# Patient Record
Sex: FEMALE | Race: WHITE | Employment: FULL TIME | URBAN - METROPOLITAN AREA
[De-identification: names, ages, dates, MRNs, and addresses within clinical notes are randomized per-mention and may not be internally consistent; named-entity substitution may affect disease eponyms.]

---

## 2020-10-08 ENCOUNTER — HOSPITAL ENCOUNTER (EMERGENCY)
Age: 30
Discharge: HOME | End: 2020-10-09
Attending: EMERGENCY MEDICINE
Payer: MEDICAID

## 2020-10-08 PROCEDURE — 99282 EMERGENCY DEPT VISIT SF MDM: CPT

## 2020-10-09 VITALS
SYSTOLIC BLOOD PRESSURE: 123 MMHG | OXYGEN SATURATION: 99 % | BODY MASS INDEX: 22.49 KG/M2 | HEIGHT: 65 IN | HEART RATE: 79 BPM | DIASTOLIC BLOOD PRESSURE: 70 MMHG | RESPIRATION RATE: 16 BRPM | WEIGHT: 135 LBS

## 2020-10-09 PROCEDURE — 6370000000 HC RX 637 (ALT 250 FOR IP): Performed by: EMERGENCY MEDICINE

## 2020-10-09 RX ORDER — HYDROXYZINE 50 MG/1
50 TABLET, FILM COATED ORAL EVERY 8 HOURS PRN
Qty: 30 TABLET | Refills: 0 | Status: SHIPPED | OUTPATIENT
Start: 2020-10-09 | End: 2020-10-19

## 2020-10-09 RX ORDER — HYDROXYZINE PAMOATE 50 MG/1
50 CAPSULE ORAL ONCE
Status: COMPLETED | OUTPATIENT
Start: 2020-10-09 | End: 2020-10-09

## 2020-10-09 RX ADMIN — HYDROXYZINE PAMOATE 50 MG: 50 CAPSULE ORAL at 00:46

## 2020-10-09 ASSESSMENT — ENCOUNTER SYMPTOMS
SORE THROAT: 0
VOMITING: 0
ABDOMINAL PAIN: 0
EYE DISCHARGE: 0
DIARRHEA: 0
BACK PAIN: 0
SHORTNESS OF BREATH: 1
COUGH: 0
NAUSEA: 0

## 2020-10-09 NOTE — ED PROVIDER NOTES
headaches. Hematological: Does not bruise/bleed easily. Psychiatric/Behavioral: Negative for confusion. The patient is nervous/anxious. Positives and Pertinent negatives as per HPI. Except as noted above in the ROS, all other systems were reviewed and negative. PAST MEDICAL HISTORY     Past Medical History:   Diagnosis Date    Asthma     Hepatitis C          SURGICAL HISTORY     Past Surgical History:   Procedure Laterality Date    TONSILLECTOMY           CURRENTMEDICATIONS       Discharge Medication List as of 10/9/2020 12:27 AM      CONTINUE these medications which have NOT CHANGED    Details   omeprazole (PRILOSEC) 40 MG capsule Take 1 capsule by mouth daily for 20 days. , Disp-20 capsule, R-0      acetaminophen (AMINOFEN) 325 MG tablet Take 1 tablet by mouth every 6 hours as needed for Pain for up to 5 days. , Disp-20 tablet, R-3      ibuprofen (IBU) 800 MG tablet Take 1 tablet by mouth every 8 hours as needed for Pain., Disp-20 tablet, R-0      ibuprofen (IBU) 600 MG tablet Take 1 tablet by mouth every 6 hours as needed for Pain for 20 doses. , Disp-20 tablet, R-0               ALLERGIES     Patient has no known allergies. FAMILYHISTORY     History reviewed. No pertinent family history.        SOCIAL HISTORY       Social History     Socioeconomic History    Marital status: Single     Spouse name: None    Number of children: None    Years of education: None    Highest education level: None   Occupational History    None   Social Needs    Financial resource strain: None    Food insecurity     Worry: None     Inability: None    Transportation needs     Medical: None     Non-medical: None   Tobacco Use    Smoking status: Never Smoker   Substance and Sexual Activity    Alcohol use: No     Comment: \"rarely\"    Drug use: Yes     Types: Marijuana    Sexual activity: Not Currently   Lifestyle    Physical activity     Days per week: None     Minutes per session: None    Stress: None Relationships    Social connections     Talks on phone: None     Gets together: None     Attends Catholic service: None     Active member of club or organization: None     Attends meetings of clubs or organizations: None     Relationship status: None    Intimate partner violence     Fear of current or ex partner: None     Emotionally abused: None     Physically abused: None     Forced sexual activity: None   Other Topics Concern    None   Social History Narrative    None       SCREENINGS             PHYSICAL EXAM    (up to 7 for level 4, 8 or more for level 5)     ED Triage Vitals [10/08/20 2159]   BP Temp Temp src Pulse Resp SpO2 Height Weight   114/83 -- -- 88 18 100 % 5' 5\" (1.651 m) 135 lb (61.2 kg)       Physical Exam  Constitutional:       General: She is not in acute distress. Appearance: She is well-developed. HENT:      Head: Normocephalic and atraumatic. Right Ear: External ear normal.      Left Ear: External ear normal.      Nose: Nose normal.      Mouth/Throat:      Pharynx: No oropharyngeal exudate. Eyes:      Conjunctiva/sclera: Conjunctivae normal.      Pupils: Pupils are equal, round, and reactive to light. Neck:      Musculoskeletal: Normal range of motion and neck supple. Cardiovascular:      Rate and Rhythm: Normal rate and regular rhythm. Heart sounds: Normal heart sounds. No murmur. No friction rub. No gallop. Pulmonary:      Effort: Pulmonary effort is normal. No respiratory distress. Breath sounds: Normal breath sounds. No wheezing. Abdominal:      General: Bowel sounds are normal. There is no distension. Palpations: Abdomen is soft. Tenderness: There is no abdominal tenderness. There is no guarding or rebound. Musculoskeletal: Normal range of motion. General: No tenderness. Lymphadenopathy:      Cervical: No cervical adenopathy. Skin:     General: Skin is warm and dry. Findings: No rash.    Neurological:      Mental Status: She is alert and oriented to person, place, and time. Cranial Nerves: No cranial nerve deficit. Psychiatric:         Mood and Affect: Mood is anxious. Affect is tearful. Speech: Speech normal.         Behavior: Behavior normal. Behavior is cooperative. Thought Content: Thought content normal.         DIAGNOSTIC RESULTS   LABS:    No results found for this visit on 10/08/20. All other labs were within normal range ornot returned as of this dictation. EKG: All EKG's are interpreted by the Emergency Department Physician who either signs or Co-signs this chart in the absence of a cardiologist.  Please see their note for interpretation of EKG. RADIOLOGY:   Non-plain film images such as CT, Ultrasound and MRI are read by the radiologist.  Plain radiographic images are visualized and preliminarily interpreted by the ED Provider with the belowfindings:    Interpretation per the Radiologist below, if available at the time of this note:    No orders to display         PROCEDURES   Unless otherwise noted below, none     Procedures    CRITICAL CARE TIME   N/A    CONSULTS:  None      EMERGENCY DEPARTMENT COURSE and DIFFERENTIAL DIAGNOSIS/MDM:   Vitals:    Vitals:    10/08/20 2159 10/08/20 2308 10/09/20 0052   BP: 114/83 122/74 123/70   Pulse: 88 80 79   Resp: 18 16 16   SpO2: 100% 99%    Weight: 135 lb (61.2 kg)     Height: 5' 5\" (1.651 m)         Patient was given the following medications:  Medications   hydrOXYzine (VISTARIL) capsule 50 mg (50 mg Oral Given 10/9/20 0046)     I planned to obtain blood work, imaging, EKG, and to monitor the patient. Patient states that she feels better and does not want to stay in the hospital.  I told her that many dangerous conditions can present with similar symptoms to anxiety. I told her that without additional testing that I could not rule these conditions out. Patient is still adamant that she go home. I am prescribing the patient hydroxyzine.   I want her to follow up closely with her PCP. She and her father are agreeable with this. I have recommended further workup including possible admission to the hospital, but Kvng Waddell refuses. The risks (including but not limited to suffering and death) as well as the benefits were explained to the patient. Questions were sought and answered and the patient voiced understanding. However, Kvng Waddell refuses admission. I have encouraged the patient to return to have their evaluation completed as we are glad to do so. I have also instructed Kvng Waddell on the importance of follow-up and to return for any worsening or worrisome concerns. Kvng Waddell appears competent to make medical decisions at this time. Differential diagnosis included but was not limited to: acute coronary syndrome, pulmonary embolism, COPD/asthma, pneumonia, sepsis, pericardial tamponade, pneumothorax, CHF, thoracic aortic dissection, anxiety. The patient understands the importance of follow up and reasons to return. Wells' Criteria for PE:   Paralysis, paresis or recent immobilization of the leg? no (0)   Clinical sign of DVT?  no (0)   Hemoptysis?  no (0)   Heart rate greater than 100 BPM?  no (0)   Malignancy w/ Treatment within 6 mo, or palliative?   no (0)      Hx of DVT or PE? no (0)   Active cancer within 6 months? no (0)   Alternative diagnosis more likely than PE? yes (0)   Total:    0          Low Risk Group: 0-2         Mod-High Risk Group: 2+    PERC Rule:  Applicable in this patient who has low clinical suspicion for pulmonary embolism. Age < 48years old: Yes  Heart rate < 100 bpm: Yes  Oxygen saturation > 95%: Yes  Hemoptysis: No  Exogenous estrogen use: No  Prior history of DVT or PE: No  Unilateral leg swelling: No  Surgery or significant trauma in the past 4 weeks: No    Based on the above, PE can effectively be ruled out without further testing. FINAL IMPRESSION      1. Shortness of breath    2.

## 2020-10-15 ENCOUNTER — APPOINTMENT (OUTPATIENT)
Dept: GENERAL RADIOLOGY | Age: 30
End: 2020-10-15
Payer: MEDICAID

## 2020-10-15 ENCOUNTER — HOSPITAL ENCOUNTER (EMERGENCY)
Age: 30
Discharge: HOME | End: 2020-10-15
Attending: EMERGENCY MEDICINE
Payer: MEDICAID

## 2020-10-15 ENCOUNTER — APPOINTMENT (OUTPATIENT)
Dept: CT IMAGING | Age: 30
End: 2020-10-15
Payer: MEDICAID

## 2020-10-15 VITALS
OXYGEN SATURATION: 100 % | BODY MASS INDEX: 22.49 KG/M2 | DIASTOLIC BLOOD PRESSURE: 81 MMHG | SYSTOLIC BLOOD PRESSURE: 128 MMHG | HEART RATE: 70 BPM | HEIGHT: 65 IN | RESPIRATION RATE: 17 BRPM | TEMPERATURE: 98 F | WEIGHT: 135 LBS

## 2020-10-15 LAB
A/G RATIO: 1.3 (ref 1.1–2.2)
ALBUMIN SERPL-MCNC: 4.9 G/DL (ref 3.4–5)
ALP BLD-CCNC: 68 U/L (ref 40–129)
ALT SERPL-CCNC: 14 U/L (ref 10–40)
AMPHETAMINE SCREEN, URINE: NORMAL
ANION GAP SERPL CALCULATED.3IONS-SCNC: 13 MMOL/L (ref 3–16)
AST SERPL-CCNC: 25 U/L (ref 15–37)
BACTERIA: ABNORMAL /HPF
BARBITURATE SCREEN URINE: NORMAL
BASOPHILS ABSOLUTE: 0 K/UL (ref 0–0.2)
BASOPHILS RELATIVE PERCENT: 0.3 %
BENZODIAZEPINE SCREEN, URINE: NORMAL
BILIRUB SERPL-MCNC: 0.3 MG/DL (ref 0–1)
BILIRUBIN URINE: NEGATIVE
BLOOD, URINE: ABNORMAL
BUN BLDV-MCNC: 8 MG/DL (ref 7–20)
CALCIUM SERPL-MCNC: 10.6 MG/DL (ref 8.3–10.6)
CANNABINOID SCREEN URINE: NORMAL
CHLORIDE BLD-SCNC: 102 MMOL/L (ref 99–110)
CLARITY: CLEAR
CO2: 22 MMOL/L (ref 21–32)
COCAINE METABOLITE SCREEN URINE: NORMAL
COLOR: ABNORMAL
CREAT SERPL-MCNC: 0.8 MG/DL (ref 0.6–1.1)
D DIMER: 269 NG/ML DDU (ref 0–229)
EKG ATRIAL RATE: 70 BPM
EKG DIAGNOSIS: NORMAL
EKG P AXIS: 62 DEGREES
EKG P-R INTERVAL: 172 MS
EKG Q-T INTERVAL: 402 MS
EKG QRS DURATION: 86 MS
EKG QTC CALCULATION (BAZETT): 434 MS
EKG R AXIS: 91 DEGREES
EKG T AXIS: 65 DEGREES
EKG VENTRICULAR RATE: 70 BPM
EOSINOPHILS ABSOLUTE: 0 K/UL (ref 0–0.6)
EOSINOPHILS RELATIVE PERCENT: 0.2 %
EPITHELIAL CELLS, UA: ABNORMAL /HPF (ref 0–5)
ETHANOL: NORMAL MG/DL (ref 0–0.08)
GFR AFRICAN AMERICAN: >60
GFR NON-AFRICAN AMERICAN: >60
GLOBULIN: 3.9 G/DL
GLUCOSE BLD-MCNC: 129 MG/DL (ref 70–99)
GLUCOSE URINE: NEGATIVE MG/DL
HCG QUALITATIVE: NEGATIVE
HCT VFR BLD CALC: 38.8 % (ref 36–48)
HEMOGLOBIN: 13.3 G/DL (ref 12–16)
KETONES, URINE: NEGATIVE MG/DL
LEUKOCYTE ESTERASE, URINE: ABNORMAL
LIPASE: 15 U/L (ref 13–60)
LYMPHOCYTES ABSOLUTE: 1.1 K/UL (ref 1–5.1)
LYMPHOCYTES RELATIVE PERCENT: 18 %
Lab: NORMAL
MCH RBC QN AUTO: 27.7 PG (ref 26–34)
MCHC RBC AUTO-ENTMCNC: 34.4 G/DL (ref 31–36)
MCV RBC AUTO: 80.5 FL (ref 80–100)
METHADONE SCREEN, URINE: NORMAL
MICROSCOPIC EXAMINATION: YES
MONOCYTES ABSOLUTE: 0.3 K/UL (ref 0–1.3)
MONOCYTES RELATIVE PERCENT: 4.2 %
NEUTROPHILS ABSOLUTE: 4.9 K/UL (ref 1.7–7.7)
NEUTROPHILS RELATIVE PERCENT: 77.3 %
NITRITE, URINE: NEGATIVE
OPIATE SCREEN URINE: NORMAL
OXYCODONE URINE: NORMAL
PDW BLD-RTO: 14.1 % (ref 12.4–15.4)
PH UA: 8.5
PH UA: 8.5 (ref 5–8)
PHENCYCLIDINE SCREEN URINE: NORMAL
PLATELET # BLD: 201 K/UL (ref 135–450)
PMV BLD AUTO: 9.3 FL (ref 5–10.5)
POTASSIUM REFLEX MAGNESIUM: 3.7 MMOL/L (ref 3.5–5.1)
PROPOXYPHENE SCREEN: NORMAL
PROTEIN UA: NEGATIVE MG/DL
RBC # BLD: 4.82 M/UL (ref 4–5.2)
RBC UA: ABNORMAL /HPF (ref 0–4)
SODIUM BLD-SCNC: 137 MMOL/L (ref 136–145)
SPECIFIC GRAVITY UA: 1.01 (ref 1–1.03)
TOTAL PROTEIN: 8.8 G/DL (ref 6.4–8.2)
TROPONIN: <0.01 NG/ML
URINE REFLEX TO CULTURE: YES
URINE TYPE: ABNORMAL
UROBILINOGEN, URINE: 0.2 E.U./DL
WBC # BLD: 6.4 K/UL (ref 4–11)
WBC UA: ABNORMAL /HPF (ref 0–5)

## 2020-10-15 PROCEDURE — G0480 DRUG TEST DEF 1-7 CLASSES: HCPCS

## 2020-10-15 PROCEDURE — 71045 X-RAY EXAM CHEST 1 VIEW: CPT

## 2020-10-15 PROCEDURE — 80053 COMPREHEN METABOLIC PANEL: CPT

## 2020-10-15 PROCEDURE — 93010 ELECTROCARDIOGRAM REPORT: CPT | Performed by: INTERNAL MEDICINE

## 2020-10-15 PROCEDURE — 84484 ASSAY OF TROPONIN QUANT: CPT

## 2020-10-15 PROCEDURE — 84703 CHORIONIC GONADOTROPIN ASSAY: CPT

## 2020-10-15 PROCEDURE — 81001 URINALYSIS AUTO W/SCOPE: CPT

## 2020-10-15 PROCEDURE — 2500000003 HC RX 250 WO HCPCS: Performed by: PHYSICIAN ASSISTANT

## 2020-10-15 PROCEDURE — 80307 DRUG TEST PRSMV CHEM ANLYZR: CPT

## 2020-10-15 PROCEDURE — 2580000003 HC RX 258: Performed by: PHYSICIAN ASSISTANT

## 2020-10-15 PROCEDURE — 6370000000 HC RX 637 (ALT 250 FOR IP): Performed by: PHYSICIAN ASSISTANT

## 2020-10-15 PROCEDURE — 71260 CT THORAX DX C+: CPT

## 2020-10-15 PROCEDURE — 93005 ELECTROCARDIOGRAM TRACING: CPT | Performed by: PHYSICIAN ASSISTANT

## 2020-10-15 PROCEDURE — 6360000004 HC RX CONTRAST MEDICATION: Performed by: PHYSICIAN ASSISTANT

## 2020-10-15 PROCEDURE — 96365 THER/PROPH/DIAG IV INF INIT: CPT

## 2020-10-15 PROCEDURE — 83690 ASSAY OF LIPASE: CPT

## 2020-10-15 PROCEDURE — 87086 URINE CULTURE/COLONY COUNT: CPT

## 2020-10-15 PROCEDURE — 99284 EMERGENCY DEPT VISIT MOD MDM: CPT

## 2020-10-15 PROCEDURE — 96375 TX/PRO/DX INJ NEW DRUG ADDON: CPT

## 2020-10-15 PROCEDURE — 85025 COMPLETE CBC W/AUTO DIFF WBC: CPT

## 2020-10-15 PROCEDURE — 85379 FIBRIN DEGRADATION QUANT: CPT

## 2020-10-15 PROCEDURE — 6360000002 HC RX W HCPCS: Performed by: PHYSICIAN ASSISTANT

## 2020-10-15 RX ORDER — CEFDINIR 300 MG/1
300 CAPSULE ORAL 2 TIMES DAILY
Qty: 20 CAPSULE | Refills: 0 | Status: SHIPPED | OUTPATIENT
Start: 2020-10-15 | End: 2020-10-25

## 2020-10-15 RX ORDER — 0.9 % SODIUM CHLORIDE 0.9 %
1000 INTRAVENOUS SOLUTION INTRAVENOUS ONCE
Status: COMPLETED | OUTPATIENT
Start: 2020-10-15 | End: 2020-10-15

## 2020-10-15 RX ORDER — FAMOTIDINE 20 MG/1
20 TABLET, FILM COATED ORAL 2 TIMES DAILY
Qty: 10 TABLET | Refills: 0 | Status: SHIPPED | OUTPATIENT
Start: 2020-10-15 | End: 2020-10-20

## 2020-10-15 RX ORDER — AZITHROMYCIN 250 MG/1
250 TABLET, FILM COATED ORAL DAILY
Qty: 4 TABLET | Refills: 0 | Status: SHIPPED | OUTPATIENT
Start: 2020-10-15 | End: 2020-10-19

## 2020-10-15 RX ORDER — LORAZEPAM 2 MG/ML
0.5 INJECTION INTRAMUSCULAR ONCE
Status: COMPLETED | OUTPATIENT
Start: 2020-10-15 | End: 2020-10-15

## 2020-10-15 RX ORDER — AZITHROMYCIN 250 MG/1
500 TABLET, FILM COATED ORAL ONCE
Status: COMPLETED | OUTPATIENT
Start: 2020-10-15 | End: 2020-10-15

## 2020-10-15 RX ORDER — ONDANSETRON 4 MG/1
4 TABLET, ORALLY DISINTEGRATING ORAL ONCE
Status: COMPLETED | OUTPATIENT
Start: 2020-10-15 | End: 2020-10-15

## 2020-10-15 RX ORDER — POLYETHYLENE GLYCOL 3350 17 G/17G
17 POWDER, FOR SOLUTION ORAL DAILY PRN
Qty: 527 G | Refills: 0 | Status: SHIPPED | OUTPATIENT
Start: 2020-10-15 | End: 2020-10-18

## 2020-10-15 RX ORDER — ONDANSETRON 4 MG/1
4 TABLET, FILM COATED ORAL EVERY 8 HOURS PRN
Qty: 10 TABLET | Refills: 0 | Status: SHIPPED | OUTPATIENT
Start: 2020-10-15

## 2020-10-15 RX ORDER — ONDANSETRON 2 MG/ML
4 INJECTION INTRAMUSCULAR; INTRAVENOUS ONCE
Status: COMPLETED | OUTPATIENT
Start: 2020-10-15 | End: 2020-10-15

## 2020-10-15 RX ADMIN — IOPAMIDOL 75 ML: 755 INJECTION, SOLUTION INTRAVENOUS at 20:16

## 2020-10-15 RX ADMIN — CEFTRIAXONE SODIUM 1 G: 1 INJECTION, POWDER, FOR SOLUTION INTRAMUSCULAR; INTRAVENOUS at 20:26

## 2020-10-15 RX ADMIN — AZITHROMYCIN 500 MG: 250 TABLET, FILM COATED ORAL at 21:50

## 2020-10-15 RX ADMIN — LORAZEPAM 0.5 MG: 2 INJECTION INTRAMUSCULAR; INTRAVENOUS at 18:59

## 2020-10-15 RX ADMIN — FAMOTIDINE 20 MG: 10 INJECTION INTRAVENOUS at 20:26

## 2020-10-15 RX ADMIN — ONDANSETRON HYDROCHLORIDE 4 MG: 2 INJECTION, SOLUTION INTRAMUSCULAR; INTRAVENOUS at 18:59

## 2020-10-15 RX ADMIN — SODIUM CHLORIDE 1000 ML: 9 INJECTION, SOLUTION INTRAVENOUS at 18:59

## 2020-10-15 RX ADMIN — ONDANSETRON 4 MG: 4 TABLET, ORALLY DISINTEGRATING ORAL at 21:50

## 2020-10-15 ASSESSMENT — PAIN DESCRIPTION - LOCATION
LOCATION: ABDOMEN
LOCATION: ABDOMEN

## 2020-10-15 ASSESSMENT — ENCOUNTER SYMPTOMS
COUGH: 1
SHORTNESS OF BREATH: 1
CONSTIPATION: 1
VOMITING: 1
ABDOMINAL PAIN: 1
NAUSEA: 1

## 2020-10-15 ASSESSMENT — PAIN DESCRIPTION - PAIN TYPE
TYPE: ACUTE PAIN
TYPE: ACUTE PAIN

## 2020-10-15 ASSESSMENT — PAIN DESCRIPTION - DESCRIPTORS: DESCRIPTORS: BURNING;ACHING

## 2020-10-15 ASSESSMENT — PAIN SCALES - GENERAL
PAINLEVEL_OUTOF10: 2
PAINLEVEL_OUTOF10: 6

## 2020-10-15 ASSESSMENT — PAIN DESCRIPTION - ORIENTATION
ORIENTATION: LOWER
ORIENTATION: LOWER

## 2020-10-15 NOTE — LETTER
[address]  [address] [facility] [address]  Phone: [phone]               October 15, 2020    Patient: Elvis Klein   YOB: 1990   Date of Visit: 10/15/2020       To Whom It May Concern:    Yudy Hardin was seen and treated in our emergency department on 10/15/2020. She may return to work on 10/18/2020.     Sincerely,       Marta Cantu RN         Signature:__________________________________

## 2020-10-15 NOTE — ED PROVIDER NOTES
[address]  EMERGENCY DEPARTMENT ENCOUNTER        Pt Name: Bishop Blanco  MRN: [id number]  [place] 1990  Date of evaluation: 10/15/2020  Provider: Myra Goodrich PA-C  PCP: Beverley Elizondo    Shared Visit or Autonomous Visit:  I have seen and evaluated this patient with my supervising physician Pranav Brar MD.    Giuseppe Cade       Chief Complaint   Patient presents with    Abdominal Pain     x 1 day    Emesis     coughing up blood       HISTORY OF PRESENT ILLNESS   (Location/Symptom, Timing/Onset, Context/Setting, Quality, Duration, Modifying Factors, Severity)  Note limiting factors. Bishop Blanco is a 27 y.o. female presenting to the emergency department for evaluation states she has not felt well for the past week states she is had a dry cough today started coughing up black and then some blood. States she has felt short of breath for the past 1 week. She went to the ER 1 week ago for her symptoms but states it was 1 in the morning and decided to leave without having testing done was given hydroxyzine for panic attack states it made her anxiety worse. Her child's father  of overdose 5 months ago she is states scared to death she is going to die too. State was having chest pain sharp stabbing pains last week. Last chest pain was 4 days ago. The past 2 days having generalized abdominal pain nausea and today vomited 3 times. No fever. History of hepatitis C. Her mother has pneumonia. States feels like her lungs are closing up on her and feels like she is going to pass out. Smokes marijuana. She is on Suboxone. Denies any other drug use. No cigarette smoking. No known heart problems. She is adopted does not know her family history. States she also started having vaginal bleeding yesterday states was not time for her period. Her last menstrual cycle was 2020.   Long history of anxiety states she had been on multiple anxiety medications in the past but nothing is ever helped her. The history is provided by the patient. Shortness of Breath   Duration:  1 week  Timing:  Constant  Progression:  Worsening  Chronicity:  New  Associated symptoms: abdominal pain, chest pain, cough and vomiting    Associated symptoms: no fever and no syncope    Abdominal pain:     Location:  Generalized    Duration:  2 days    Chronicity:  New  Chest pain:     Quality: sharp      Timing:  Intermittent    Chronicity:  New  Risk factors: no hx of PE/DVT, no obesity, no oral contraceptive use, no prolonged immobilization, no recent surgery and no tobacco use          Nursing Notes were reviewed    REVIEW OF SYSTEMS    (2-9 systems for level 4, 10 or more for level 5)     Review of Systems   Constitutional: Positive for fatigue. Negative for fever. Respiratory: Positive for cough and shortness of breath. Coughing blood   Cardiovascular: Positive for chest pain. Negative for leg swelling and syncope. Gastrointestinal: Positive for abdominal pain, constipation, nausea and vomiting. Genitourinary: Positive for dysuria, frequency and vaginal bleeding. Neurological: Positive for light-headedness. Negative for syncope. Psychiatric/Behavioral: Negative for confusion. All other systems reviewed and are negative. Positives and Pertinent negatives as per HPI. PAST MEDICAL HISTORY     Past Medical History:   Diagnosis Date    Asthma     Hepatitis C          SURGICAL HISTORY     Past Surgical History:   Procedure Laterality Date    TONSILLECTOMY           CURRENTMEDICATIONS       Discharge Medication List as of 10/15/2020  9:48 PM      CONTINUE these medications which have NOT CHANGED    Details   hydrOXYzine (ATARAX) 50 MG tablet Take 1 tablet by mouth every 8 hours as needed for Itching, Disp-30 tablet,R-0Normal      omeprazole (PRILOSEC) 40 MG capsule Take 1 capsule by mouth daily for 20 days. , Disp-20 capsule, R-0      acetaminophen (AMINOFEN) 325 MG tablet Take 1 tablet by mouth every 6 hours as needed for Pain for up to 5 days. , Disp-20 tablet, R-3      ibuprofen (IBU) 800 MG tablet Take 1 tablet by mouth every 8 hours as needed for Pain., Disp-20 tablet, R-0      ibuprofen (IBU) 600 MG tablet Take 1 tablet by mouth every 6 hours as needed for Pain for 20 doses. , Disp-20 tablet, R-0               ALLERGIES     Patient has no known allergies. FAMILYHISTORY     No family history on file.        SOCIAL HISTORY       Social History     Socioeconomic History    Marital status: Single     Spouse name: Not on file    Number of children: Not on file    Years of education: Not on file    Highest education level: Not on file   Occupational History    Not on file   Social Needs    Financial resource strain: Not on file    Food insecurity     Worry: Not on file     Inability: Not on file    Transportation needs     Medical: Not on file     Non-medical: Not on file   Tobacco Use    Smoking status: Never Smoker    Smokeless tobacco: Never Used   Substance and Sexual Activity    Alcohol use: No     Comment: \"rarely\"    Drug use: Yes     Types: Marijuana    Sexual activity: Not Currently   Lifestyle    Physical activity     Days per week: Not on file     Minutes per session: Not on file    Stress: Not on file   Relationships    Social connections     Talks on phone: Not on file     Gets together: Not on file     Attends Confucianist service: Not on file     Active member of club or organization: Not on file     Attends meetings of clubs or organizations: Not on file     Relationship status: Not on file    Intimate partner violence     Fear of current or ex partner: Not on file     Emotionally abused: Not on file     Physically abused: Not on file     Forced sexual activity: Not on file   Other Topics Concern    Not on file   Social History Narrative    Not on file       SCREENINGS             PHYSICAL EXAM    (up to 7 for level 4, 8 or more for level 5)     ED Triage Vitals [10/15/20 1821]   BP Temp Temp Source Pulse Resp SpO2 Height Weight   (!) 141/64 98 °F (36.7 °C) Oral 77 18 100 % 5' 5\" (1.651 m) 135 lb (61.2 kg)       Physical Exam  Vitals signs and nursing note reviewed. Constitutional:       Appearance: She is well-developed. She is not toxic-appearing. Comments: Hyperventilating. Tearful. HENT:      Head: Normocephalic and atraumatic. Mouth/Throat:      Mouth: Mucous membranes are moist.      Pharynx: Oropharynx is clear. No pharyngeal swelling, oropharyngeal exudate or posterior oropharyngeal erythema. Eyes:      Extraocular Movements: Extraocular movements intact. Conjunctiva/sclera: Conjunctivae normal.      Pupils: Pupils are equal, round, and reactive to light. Neck:      Musculoskeletal: Normal range of motion and neck supple. Vascular: No JVD. Cardiovascular:      Rate and Rhythm: Normal rate and regular rhythm. Pulses:           Radial pulses are 2+ on the right side and 2+ on the left side. Posterior tibial pulses are 2+ on the right side and 2+ on the left side. Pulmonary:      Effort: Pulmonary effort is normal. No respiratory distress. Breath sounds: Normal breath sounds. Comments: O2 sat 100% on room air  Abdominal:      General: Bowel sounds are normal. There is no distension. Palpations: Abdomen is soft. Abdomen is not rigid. There is no mass. Tenderness: There is no abdominal tenderness. There is no guarding or rebound. Musculoskeletal: Normal range of motion. Skin:     General: Skin is warm and dry. Findings: No rash. Neurological:      Mental Status: She is alert and oriented to person, place, and time. GCS: GCS eye subscore is 4. GCS verbal subscore is 5. GCS motor subscore is 6. Cranial Nerves: No cranial nerve deficit. Sensory: Sensation is intact. No sensory deficit. Motor: Motor function is intact.  No weakness (5/5 upper and [address]   Phone [phone]   LIPASE    Narrative:     Performed at:  [facility],  [place], [facility]   Phone [phone]   TROPONIN    Narrative:     Performed at:  [facility],  [place], [place]   Phone [phone]   ETHANOL    Narrative:     Performed at:  [address] [place] [address],  [place], [facility]   Phone [phone]   HCG, SERUM, QUALITATIVE    Narrative:     Performed at:  [address] [place] [address],  [place], [facility]   Phone [phone]     Results for orders placed or performed during the hospital encounter of 10/15/20   Urinalysis Reflex to Culture    Specimen: Urine, clean catch   Result Value Ref Range    Color, UA Straw Straw/Yellow    Clarity, UA Clear Clear    Glucose, Ur Negative Negative mg/dL    Bilirubin Urine Negative Negative    Ketones, Urine Negative Negative mg/dL    Specific Gravity, UA 1.015 1.005 - 1.030    Blood, Urine SMALL (A) Negative    pH, UA 8.5 (A) 5.0 - 8.0    Protein, UA Negative Negative mg/dL    Urobilinogen, Urine 0.2 <2.0 E.U./dL    Nitrite, Urine Negative Negative    Leukocyte Esterase, Urine SMALL (A) Negative    Microscopic Examination YES     Urine Type NotGiven     Urine Reflex to Culture Yes    Urine Drug Screen   Result Value Ref Range    Amphetamine Screen, Urine Neg Negative <1000ng/mL    Barbiturate Screen, Ur Neg Negative <200 ng/mL    Benzodiazepine Screen, Urine Neg Negative <200 ng/mL    Cannabinoid Scrn, Ur Neg Negative <50 ng/mL    Cocaine Metabolite Screen, Urine Neg Negative <300 ng/mL    Opiate Scrn, Ur Neg Negative <300 ng/mL    PCP Screen, Urine Neg Negative <25 ng/mL    Methadone Screen, Urine Neg Negative <300 ng/mL    Propoxyphene Scrn, Ur Neg Negative <300 ng/mL    Oxycodone Urine Neg Negative <100 ng/ml    pH, UA 8.5     Drug Screen Comment: see below Ref Range    Ventricular Rate 70 BPM    Atrial Rate 70 BPM    P-R Interval 172 ms    QRS Duration 86 ms    Q-T Interval 402 ms    QTc Calculation (Bazett) 434 ms    P Axis 62 degrees    R Axis 91 degrees    T Axis 65 degrees    Diagnosis       Normal sinus rhythmRightward axisBorderline ECGWhen compared with ECG of 30-JUL-2017 20:36,No significant change was foundConfirmed by Codey Ty MD, [address] ([facility]) on 10/15/2020 8:12:12 PM         All other labs were within normal range or not returned as of this dictation. EKG: All EKG's are interpreted by the Emergency Department Physician in the absence of a cardiologist.  Please see their note for interpretation of EKG. RADIOLOGY:   Non-plain film images such as CT, Ultrasound and MRI are read by the radiologist. Plain radiographic images are visualized andpreliminarily interpreted by the  ED Provider with the below findings:        Interpretation perthe Radiologist below, if available at the time of this note:    CT CHEST PULMONARY EMBOLISM W CONTRAST   Final Result   1. Negative for pulmonary embolus. 2. Mild right basilar subsegmental atelectasis versus pneumonia. XR CHEST PORTABLE   Final Result   No acute process. Xr Chest Portable    Result Date: 10/15/2020  EXAMINATION: ONE XRAY VIEW OF THE CHEST 10/15/2020 6:59 pm COMPARISON: 07/30/2017 HISTORY: ORDERING SYSTEM PROVIDED HISTORY: sob TECHNOLOGIST PROVIDED HISTORY: Reason for exam:->sob Reason for Exam: cough sob FINDINGS: The lungs are without acute focal process. There is no effusion or pneumothorax. The cardiomediastinal silhouette is stable. The osseous structures are stable. No acute process. Ct Chest Pulmonary Embolism W Contrast    Result Date: 10/15/2020  EXAMINATION: CTA OF THE CHEST 10/15/2020 8:17 pm TECHNIQUE: CTA of the chest was performed after the administration of intravenous contrast.  Multiplanar reformatted images are provided for review.   MIP images are provided for review. Dose modulation, iterative reconstruction, and/or weight based adjustment of the mA/kV was utilized to reduce the radiation dose to as low as reasonably achievable. COMPARISON: None. HISTORY: ORDERING SYSTEM PROVIDED HISTORY: sob, hemoptysis, elevated d-dimer r/o PE TECHNOLOGIST PROVIDED HISTORY: Reason for exam:->sob, hemoptysis, elevated d-dimer r/o PE Reason for Exam: Pt c/o SOB x1week, pt states she started coughing up blood today. Pt c/o abdominal pain as well. Acuity: Acute Type of Exam: Initial FINDINGS: Pulmonary Arteries: Pulmonary arteries are adequately opacified for evaluation. No evidence of intraluminal filling defect to suggest pulmonary embolism. Main pulmonary artery is normal in caliber. Mediastinum: No evidence of mediastinal lymphadenopathy. The heart and pericardium demonstrate no acute abnormality. There is no acute abnormality of the thoracic aorta. Lungs/pleura: There is segmental airspace disease within the right lateral costophrenic angle. The lungs are otherwise clear. There is no pneumothorax or effusion. Upper Abdomen: Limited images of the upper abdomen are unremarkable. Soft Tissues/Bones: No acute bone or soft tissue abnormality. 1. Negative for pulmonary embolus. 2. Mild right basilar subsegmental atelectasis versus pneumonia.          PROCEDURES   Unless otherwise noted below, none     Procedures    CRITICAL CARE TIME   N/A    CONSULTS:  None      EMERGENCY DEPARTMENT COURSE and DIFFERENTIAL DIAGNOSIS/MDM:   Vitals:    Vitals:    10/15/20 1821 10/15/20 2145   BP: (!) 141/64 128/81   Pulse: 77 70   Resp: 18 17   Temp: 98 °F (36.7 °C)    TempSrc: Oral    SpO2: 100% 100%   Weight: 135 lb (61.2 kg)    Height: 5' 5\" (1.651 m)        Patient was given thefollowing medications:  Medications   0.9 % sodium chloride bolus (0 mLs Intravenous Stopped 10/15/20 2100)   ondansetron (ZOFRAN) injection 4 mg (4 mg Intravenous Given 10/15/20 1859)   LORazepam (ATIVAN) injection 0.5

## 2020-10-15 NOTE — ED PROVIDER NOTES
Emergency Department Provider Note     Location: [place] [address] ED  10/15/2020    I independently performed a history and physical on [facility]. All diagnostic, treatment, and disposition decisions were made by myself in conjunction with the mid-level provider. Briefly, this is a 27 y.o. female here for abdominal pain, shortness of breath, chest pain, nausea and vomiting. Patient reports that her symptoms have been present since last stay. Patient reports feeling like her throat is closing up and that she is having difficulty getting breath in. She reports that that is the sensation of pain in her chest.  She also reports abdominal pain that she describes as burning in her upper abdomen and feeling constipated. Patient has noted some blood when she wipes. Today she coughed and coughed up some black stuff though she denies any tobacco abuse. She also reports that she has vomited several times. .      ED Triage Vitals [10/15/20 1821]   BP Temp Temp Source Pulse Resp SpO2 Height Weight   (!) 141/64 98 °F (36.7 °C) Oral 77 18 100 % 5' 5\" (1.651 m) 135 lb (61.2 kg)        Patient resting comfortably in no acute distress. Heart is regular rate and rhythm. Lungs clear to auscultation bilaterally. Abdomen is soft, nondistended, and nontender. No peripheral edema noted. Patient seen and examined. Vital signs stable and within normal limits. Physical exam as documented above. Lab work obtained and notable for elevated d-dimer and UA consistent with UTI. CT chest obtained and is concerning for atelectasis versus pneumonia. As patient has had productive cough we will go ahead and treat her with antibiotics to cover both a pneumonia as well as a UTI. Patient discharged home with return precautions. EKG  The Ekg interpreted by me in the absence of a cardiologist shows.   Normal sinus rhythm, rate 70, normal intervals, no STEMI, similar to previous EKG dated July 30, 2017      Xr Chest Portable    Result Date: 10/15/2020  EXAMINATION: ONE XRAY VIEW OF THE CHEST 10/15/2020 6:59 pm COMPARISON: 07/30/2017 HISTORY: ORDERING SYSTEM PROVIDED HISTORY: sob TECHNOLOGIST PROVIDED HISTORY: Reason for exam:->sob Reason for Exam: cough sob FINDINGS: The lungs are without acute focal process. There is no effusion or pneumothorax. The cardiomediastinal silhouette is stable. The osseous structures are stable. No acute process. Ct Chest Pulmonary Embolism W Contrast    Result Date: 10/15/2020  EXAMINATION: CTA OF THE CHEST 10/15/2020 8:17 pm TECHNIQUE: CTA of the chest was performed after the administration of intravenous contrast.  Multiplanar reformatted images are provided for review. MIP images are provided for review. Dose modulation, iterative reconstruction, and/or weight based adjustment of the mA/kV was utilized to reduce the radiation dose to as low as reasonably achievable. COMPARISON: None. HISTORY: ORDERING SYSTEM PROVIDED HISTORY: sob, hemoptysis, elevated d-dimer r/o PE TECHNOLOGIST PROVIDED HISTORY: Reason for exam:->sob, hemoptysis, elevated d-dimer r/o PE Reason for Exam: Pt c/o SOB x1week, pt states she started coughing up blood today. Pt c/o abdominal pain as well. Acuity: Acute Type of Exam: Initial FINDINGS: Pulmonary Arteries: Pulmonary arteries are adequately opacified for evaluation. No evidence of intraluminal filling defect to suggest pulmonary embolism. Main pulmonary artery is normal in caliber. Mediastinum: No evidence of mediastinal lymphadenopathy. The heart and pericardium demonstrate no acute abnormality. There is no acute abnormality of the thoracic aorta. Lungs/pleura: There is segmental airspace disease within the right lateral costophrenic angle. The lungs are otherwise clear. There is no pneumothorax or effusion. Upper Abdomen: Limited images of the upper abdomen are unremarkable. Soft Tissues/Bones: No acute bone or soft tissue abnormality.      1. Negative for pulmonary embolus. 2. Mild right basilar subsegmental atelectasis versus pneumonia.          Results for orders placed or performed during the hospital encounter of 10/15/20   Urinalysis Reflex to Culture    Specimen: Urine, clean catch   Result Value Ref Range    Color, UA Straw Straw/Yellow    Clarity, UA Clear Clear    Glucose, Ur Negative Negative mg/dL    Bilirubin Urine Negative Negative    Ketones, Urine Negative Negative mg/dL    Specific Gravity, UA 1.015 1.005 - 1.030    Blood, Urine SMALL (A) Negative    pH, UA 8.5 (A) 5.0 - 8.0    Protein, UA Negative Negative mg/dL    Urobilinogen, Urine 0.2 <2.0 E.U./dL    Nitrite, Urine Negative Negative    Leukocyte Esterase, Urine SMALL (A) Negative    Microscopic Examination YES     Urine Type NotGiven     Urine Reflex to Culture Yes    Urine Drug Screen   Result Value Ref Range    Amphetamine Screen, Urine Neg Negative <1000ng/mL    Barbiturate Screen, Ur Neg Negative <200 ng/mL    Benzodiazepine Screen, Urine Neg Negative <200 ng/mL    Cannabinoid Scrn, Ur Neg Negative <50 ng/mL    Cocaine Metabolite Screen, Urine Neg Negative <300 ng/mL    Opiate Scrn, Ur Neg Negative <300 ng/mL    PCP Screen, Urine Neg Negative <25 ng/mL    Methadone Screen, Urine Neg Negative <300 ng/mL    Propoxyphene Scrn, Ur Neg Negative <300 ng/mL    Oxycodone Urine Neg Negative <100 ng/ml    pH, UA 8.5     Drug Screen Comment: see below    CBC Auto Differential   Result Value Ref Range    WBC 6.4 4.0 - 11.0 K/uL    RBC 4.82 4.00 - 5.20 M/uL    Hemoglobin 13.3 12.0 - 16.0 g/dL    Hematocrit 38.8 36.0 - 48.0 %    MCV 80.5 80.0 - 100.0 fL    MCH 27.7 26.0 - 34.0 pg    MCHC 34.4 31.0 - 36.0 g/dL    RDW 14.1 12.4 - 15.4 %    Platelets 596 031 - 110 K/uL    MPV 9.3 5.0 - 10.5 fL    Neutrophils % 77.3 %    Lymphocytes % 18.0 %    Monocytes % 4.2 %    Eosinophils % 0.2 %    Basophils % 0.3 %    Neutrophils Absolute 4.9 1.7 - 7.7 K/uL    Lymphocytes Absolute 1.1 1.0 - 5.1 K/uL    Monocytes Absolute 0.3 0.0 - 1.3 K/uL    Eosinophils Absolute 0.0 0.0 - 0.6 K/uL    Basophils Absolute 0.0 0.0 - 0.2 K/uL   Comprehensive Metabolic Panel w/ Reflex to MG   Result Value Ref Range    Sodium 137 136 - 145 mmol/L    Potassium reflex Magnesium 3.7 3.5 - 5.1 mmol/L    Chloride 102 99 - 110 mmol/L    CO2 22 21 - 32 mmol/L    Anion Gap 13 3 - 16    Glucose 129 (H) 70 - 99 mg/dL    BUN 8 7 - 20 mg/dL    CREATININE 0.8 0.6 - 1.1 mg/dL    GFR Non-African American >60 >60    GFR African American >60 >60    Calcium 10.6 8.3 - 10.6 mg/dL    Total Protein 8.8 (H) 6.4 - 8.2 g/dL    Alb 4.9 3.4 - 5.0 g/dL    Albumin/Globulin Ratio 1.3 1.1 - 2.2    Total Bilirubin 0.3 0.0 - 1.0 mg/dL    Alkaline Phosphatase 68 40 - 129 U/L    ALT 14 10 - 40 U/L    AST 25 15 - 37 U/L    Globulin 3.9 g/dL   Lipase   Result Value Ref Range    Lipase 15.0 13.0 - 60.0 U/L   Troponin   Result Value Ref Range    Troponin <0.01 <0.01 ng/mL   Ethanol   Result Value Ref Range    Ethanol Lvl None Detected mg/dL   HCG Qualitative, Serum   Result Value Ref Range    hCG Qual Negative Detects HCG level >10 MIU/mL   D-dimer, quantitative   Result Value Ref Range    D-Dimer, Quant 269 (H) 0 - 229 ng/mL DDU   Microscopic Urinalysis   Result Value Ref Range    WBC, UA 10-20 (A) 0 - 5 /HPF    RBC, UA 3-4 0 - 4 /HPF    Epithelial Cells, UA 2-5 0 - 5 /HPF    Bacteria, UA 1+ (A) None Seen /HPF   EKG 12 Lead   Result Value Ref Range    Ventricular Rate 70 BPM    Atrial Rate 70 BPM    P-R Interval 172 ms    QRS Duration 86 ms    Q-T Interval 402 ms    QTc Calculation (Bazett) 434 ms    P Axis 62 degrees    R Axis 91 degrees    T Axis 65 degrees    Diagnosis       Normal sinus rhythmRightward axisBorderline ECGWhen compared with ECG of 30-JUL-2017 20:36,No significant change was foundConfirmed by HEIKE KAUR, [address] ([id number]) on 10/15/2020 8:12:12 PM       For further details of [address] [place] [address] emergency department encounter, please see Parviz Frias documentation. This chart was generated in part by using Dragon Dictation system and may contain errors related to that system including errors in grammar, punctuation, and spelling, as well as words and phrases that may be inappropriate. If there are any questions or concerns please feel free to contact the dictating provider for clarification.            Pranav Amezquita MD  10/16/20 0003

## 2020-10-16 LAB — URINE CULTURE, ROUTINE: NORMAL

## 2022-06-05 ENCOUNTER — HOSPITAL ENCOUNTER (EMERGENCY)
Age: 32
Discharge: HOME | End: 2022-06-05
Payer: MEDICAID

## 2022-06-05 VITALS
DIASTOLIC BLOOD PRESSURE: 81 MMHG | SYSTOLIC BLOOD PRESSURE: 124 MMHG | BODY MASS INDEX: 24.99 KG/M2 | OXYGEN SATURATION: 99 % | TEMPERATURE: 98.6 F | RESPIRATION RATE: 16 BRPM | HEIGHT: 65 IN | WEIGHT: 150 LBS | HEART RATE: 70 BPM

## 2022-06-05 DIAGNOSIS — K02.9 PAIN DUE TO DENTAL CARIES: Primary | ICD-10-CM

## 2022-06-05 PROCEDURE — 99283 EMERGENCY DEPT VISIT LOW MDM: CPT

## 2022-06-05 PROCEDURE — 6370000000 HC RX 637 (ALT 250 FOR IP): Performed by: PHYSICIAN ASSISTANT

## 2022-06-05 RX ORDER — CLINDAMYCIN HYDROCHLORIDE 150 MG/1
450 CAPSULE ORAL ONCE
Status: COMPLETED | OUTPATIENT
Start: 2022-06-05 | End: 2022-06-05

## 2022-06-05 RX ORDER — CLINDAMYCIN HYDROCHLORIDE 150 MG/1
450 CAPSULE ORAL 3 TIMES DAILY
Qty: 63 CAPSULE | Refills: 0 | Status: SHIPPED | OUTPATIENT
Start: 2022-06-05 | End: 2022-06-12

## 2022-06-05 RX ADMIN — CLINDAMYCIN HYDROCHLORIDE 450 MG: 150 CAPSULE ORAL at 13:10

## 2022-06-05 ASSESSMENT — ENCOUNTER SYMPTOMS
RESPIRATORY NEGATIVE: 1
NAUSEA: 0
VOMITING: 0
GASTROINTESTINAL NEGATIVE: 1

## 2022-06-05 ASSESSMENT — PAIN DESCRIPTION - ORIENTATION: ORIENTATION: RIGHT

## 2022-06-05 ASSESSMENT — PAIN DESCRIPTION - DESCRIPTORS: DESCRIPTORS: ACHING

## 2022-06-05 ASSESSMENT — PAIN - FUNCTIONAL ASSESSMENT
PAIN_FUNCTIONAL_ASSESSMENT: 0-10
PAIN_FUNCTIONAL_ASSESSMENT: NONE - DENIES PAIN

## 2022-06-05 ASSESSMENT — PAIN DESCRIPTION - ONSET: ONSET: GRADUAL

## 2022-06-05 ASSESSMENT — PAIN DESCRIPTION - LOCATION: LOCATION: TEETH

## 2022-06-05 ASSESSMENT — PAIN DESCRIPTION - FREQUENCY: FREQUENCY: CONTINUOUS

## 2022-06-05 ASSESSMENT — PAIN SCALES - GENERAL: PAINLEVEL_OUTOF10: 5

## 2022-06-05 ASSESSMENT — PAIN DESCRIPTION - PAIN TYPE: TYPE: ACUTE PAIN

## 2022-06-05 NOTE — ED PROVIDER NOTES
[address] ED  EMERGENCY DEPARTMENT ENCOUNTER        Pt Name: Sidra Ko  MRN: [id number]  [place] 1990  Date of evaluation: 6/5/2022  Provider: Roberto Orlando PA-C  PCP: Enoch Kapoor  Note Started: 1:03 PM EDT       ANABELA. I have evaluated this patient. My supervising physician was available for consultation. CHIEF COMPLAINT       Chief Complaint   Patient presents with    Dental Pain     pt states multiple broken teeth, pain and swelling on right side. HISTORY OF PRESENT ILLNESS   (Location, Timing/Onset, Context/Setting, Quality, Duration, Modifying Factors, Severity, Associated Signs and Symptoms)  Note limiting factors. Chief Complaint: right sided dental pain    Sidra Ko is a 32 y.o. female with a past medical history of asthma, hepatitis C brought in today by private vehicle with complaints of right-sided dental pain. Onset of symptoms started yesterday. States that she broke a tooth and since then developed right-sided upper dental pain and gum swelling. Duration of symptoms have been persistent since onset. Context includes right-sided upper dental pain. No aggravating or alleviating symptoms. Denies any fevers or chills. Denies nausea or vomiting. Denies difficulty swallowing. Denies chest pain or shortness of breath. She is been taking Tylenol and ibuprofen with relief of the pain. Rates her current pain a 5 out of 10 no radiation. Otherwise denies any other complaints. Nothing seems to make symptoms better or worse. Nursing Notes were all reviewed and agreed with or any disagreements were addressed in the HPI. REVIEW OF SYSTEMS    (2-9 systems for level 4, 10 or more for level 5)     Review of Systems   Constitutional: Negative. Negative for chills and fever. HENT: Positive for dental problem. Respiratory: Negative. Cardiovascular: Negative. Gastrointestinal: Negative. Negative for nausea and vomiting.    Skin: Negative. Neurological: Negative. Positives and Pertinent negatives as per HPI. Except as noted above in the ROS, all other systems were reviewed and negative. PAST MEDICAL HISTORY     Past Medical History:   Diagnosis Date    Asthma     Hepatitis C          SURGICAL HISTORY     Past Surgical History:   Procedure Laterality Date    TONSILLECTOMY           CURRENTMEDICATIONS       Previous Medications    ACETAMINOPHEN (AMINOFEN) 325 MG TABLET    Take 1 tablet by mouth every 6 hours as needed for Pain for up to 5 days. FAMOTIDINE (PEPCID) 20 MG TABLET    Take 1 tablet by mouth 2 times daily for 5 days    IBUPROFEN (IBU) 600 MG TABLET    Take 1 tablet by mouth every 6 hours as needed for Pain for 20 doses. IBUPROFEN (IBU) 800 MG TABLET    Take 1 tablet by mouth every 8 hours as needed for Pain. OMEPRAZOLE (PRILOSEC) 40 MG CAPSULE    Take 1 capsule by mouth daily for 20 days. ONDANSETRON (ZOFRAN) 4 MG TABLET    Take 1 tablet by mouth every 8 hours as needed for Nausea         ALLERGIES     Patient has no known allergies. FAMILYHISTORY     History reviewed. No pertinent family history. SOCIAL HISTORY       Social History     Tobacco Use    Smoking status: Never Smoker    Smokeless tobacco: Never Used   Vaping Use    Vaping Use: Never used   Substance Use Topics    Alcohol use: No     Comment: \"rarely\"    Drug use: Yes     Types: Marijuana (Weed)       SCREENINGS             PHYSICAL EXAM    (up to 7 for level 4, 8 or more for level 5)     ED Triage Vitals [06/05/22 1216]   BP Temp Temp Source Heart Rate Resp SpO2 Height Weight   124/81 98.6 °F (37 °C) Oral 70 16 99 % 5' 5\" (1.651 m) 150 lb (68 kg)       Physical Exam  Vitals and nursing note reviewed. Constitutional:       General: She is awake. She is not in acute distress. Appearance: Normal appearance. She is well-developed and normal weight. She is not ill-appearing, toxic-appearing or diaphoretic.    HENT:      Head: Normocephalic and atraumatic. Right Ear: Tympanic membrane and external ear normal. No drainage, swelling or tenderness. No middle ear effusion. There is no impacted cerumen. No foreign body. No mastoid tenderness. No PE tube. No hemotympanum. Tympanic membrane is not injected, scarred, perforated, erythematous, retracted or bulging. Left Ear: Tympanic membrane and external ear normal. No drainage, swelling or tenderness. No middle ear effusion. There is no impacted cerumen. No foreign body. No mastoid tenderness. No PE tube. No hemotympanum. Tympanic membrane is not injected, scarred, perforated, erythematous, retracted or bulging. Nose: Nose normal.      Mouth/Throat:      Lips: Pink. No lesions. Mouth: Mucous membranes are moist. No injury, lacerations, oral lesions or angioedema. Dentition: Abnormal dentition. Does not have dentures. Gingival swelling and dental caries present. No dental tenderness, dental abscesses or gum lesions. Tongue: No lesions. Tongue does not deviate from midline. Palate: No mass and lesions. Pharynx: Oropharynx is clear. Uvula midline. No pharyngeal swelling, oropharyngeal exudate, posterior oropharyngeal erythema or uvula swelling. Tonsils: No tonsillar exudate or tonsillar abscesses. 0 on the right. 0 on the left. Comments: Poor dentition noted throughout. No swelling to the tongue or the floor of the mouth. Uvula midline. No deviation. Eyes:      General:         Right eye: No discharge. Left eye: No discharge. Neck:      Meningeal: Brudzinski's sign and Kernig's sign absent. Cardiovascular:      Rate and Rhythm: Normal rate and regular rhythm. Pulses:           Radial pulses are 2+ on the right side and 2+ on the left side. Heart sounds: Normal heart sounds. No murmur heard. No gallop. Pulmonary:      Effort: Pulmonary effort is normal. No respiratory distress. Breath sounds: Normal breath sounds.  No decreased breath sounds, wheezing, rhonchi or rales. Chest:      Chest wall: No tenderness. Musculoskeletal:         General: No deformity. Normal range of motion. Cervical back: Normal range of motion and neck supple. Right lower leg: No edema. Left lower leg: No edema. Lymphadenopathy:      Cervical: No cervical adenopathy. Right cervical: No superficial, deep or posterior cervical adenopathy. Left cervical: No superficial, deep or posterior cervical adenopathy. Skin:     General: Skin is warm and dry. Neurological:      General: No focal deficit present. Mental Status: She is alert and oriented to person, place, and time. GCS: GCS eye subscore is 4. GCS verbal subscore is 5. GCS motor subscore is 6. Cranial Nerves: Cranial nerves are intact. Psychiatric:         Behavior: Behavior normal. Behavior is cooperative. DIAGNOSTIC RESULTS   LABS:    Labs Reviewed - No data to display    When ordered only abnormal lab results are displayed. All other labs were within normal range or not returned as of this dictation. EKG: When ordered, EKG's are interpreted by the Emergency Department Physician in the absence of a cardiologist.  Please see their note for interpretation of EKG. RADIOLOGY:   Non-plain film images such as CT, Ultrasound and MRI are read by the radiologist. Plain radiographic images are visualized and preliminarily interpreted by the ED Provider with the below findings:        Interpretation per the Radiologist below, if available at the time of this note:    No orders to display     No results found.         PROCEDURES   Unless otherwise noted below, none     Procedures    CRITICAL CARE TIME       CONSULTS:  None      EMERGENCY DEPARTMENT COURSE and DIFFERENTIAL DIAGNOSIS/MDM:   Vitals:    Vitals:    06/05/22 1216   BP: 124/81   Pulse: 70   Resp: 16   Temp: 98.6 °F (37 °C)   TempSrc: Oral   SpO2: 99%   Weight: 150 lb (68 kg)   Height: 5' 5\" (1.651 m)       Patient was given the following medications:  Medications   clindamycin (CLEOCIN) capsule 450 mg (has no administration in time range)      Patient brought in today by private vehicle with complaints of right-sided upper dental pain. On exam she is alert oriented afebrile breathing on room air satting at 99%. Patient nontoxic in appearance no acute respiratory distress. Old labs and records reviewed. Patient seen by myself and my attending as well as needed. Patient did not want a thing here for pain. Requested clindamycin as she cannot tolerate amoxicillin or penicillin. We will give 1 dose of clindamycin here and a prescription for home. She is given outpatient referral for the dentist.  Patient told return immediately to the ED if she developed any new or worsening symptoms including but not limited to increased pain, swelling fevers difficulty swallowing or any new or changing symptoms. She verbalized understanding. Patient discharged in stable condition. FINAL IMPRESSION      1.  Pain due to dental caries          DISPOSITION/PLAN   DISPOSITION Discharge - Pending Orders Complete 06/05/2022 12:54:14 PM      PATIENT REFERRED TO:  Krystle Verdugo  [facility]. [facility] [address] [facility] [address]  [phone]    Schedule an appointment as soon as possible for a visit   As needed, If symptoms worsen    [address]  [address] [facility]  Schedule an appointment as soon as possible for a visit   As needed, If symptoms worsen      DISCHARGE MEDICATIONS:  New Prescriptions    CLINDAMYCIN (CLEOCIN) 150 MG CAPSULE    Take 3 capsules by mouth 3 times daily for 7 days       DISCONTINUED MEDICATIONS:  Discontinued Medications    No medications on file              (Please note that portions of this note were completed with a voice recognition program.  Efforts were made to edit the dictations but occasionally words are mis-transcribed.)    [address]  Joseline Cuenca PA-C (electronically signed)            Nisa Champagne PA-C  06/05/22 7045

## 2023-06-05 ENCOUNTER — HOSPITAL ENCOUNTER (EMERGENCY)
Age: 33
Discharge: HOME | End: 2023-06-05
Attending: EMERGENCY MEDICINE
Payer: MEDICAID

## 2023-06-05 VITALS
TEMPERATURE: 98.7 F | SYSTOLIC BLOOD PRESSURE: 139 MMHG | BODY MASS INDEX: 28.32 KG/M2 | DIASTOLIC BLOOD PRESSURE: 81 MMHG | HEART RATE: 58 BPM | RESPIRATION RATE: 18 BRPM | OXYGEN SATURATION: 98 % | HEIGHT: 65 IN | WEIGHT: 170 LBS

## 2023-06-05 DIAGNOSIS — K08.89 PAIN, DENTAL: Primary | ICD-10-CM

## 2023-06-05 PROCEDURE — 99283 EMERGENCY DEPT VISIT LOW MDM: CPT

## 2023-06-05 RX ORDER — CLINDAMYCIN HYDROCHLORIDE 300 MG/1
300 CAPSULE ORAL 3 TIMES DAILY
Qty: 21 CAPSULE | Refills: 0 | Status: SHIPPED | OUTPATIENT
Start: 2023-06-05 | End: 2023-06-12

## 2023-06-05 RX ORDER — IBUPROFEN 800 MG/1
800 TABLET ORAL 2 TIMES DAILY PRN
Qty: 60 TABLET | Refills: 0 | Status: SHIPPED | OUTPATIENT
Start: 2023-06-05

## 2023-06-05 ASSESSMENT — PAIN SCALES - GENERAL: PAINLEVEL_OUTOF10: 4

## 2023-06-05 ASSESSMENT — LIFESTYLE VARIABLES
HOW OFTEN DO YOU HAVE A DRINK CONTAINING ALCOHOL: NEVER
HOW MANY STANDARD DRINKS CONTAINING ALCOHOL DO YOU HAVE ON A TYPICAL DAY: PATIENT DOES NOT DRINK

## 2023-06-05 NOTE — DISCHARGE INSTRUCTIONS
Please keep the dentist appointment.   Take the antibiotics as directed return if you develop any difficulty breathing or worsening symptoms

## 2023-06-05 NOTE — ED PROVIDER NOTES
Vaping Use    Vaping Use: Never used   Substance Use Topics    Alcohol use: No     Comment: \"rarely\"    Drug use: Yes     Types: Marijuana (Weed)       SCREENINGS        [place] Coma Scale  Eye Opening: Spontaneous  Best Verbal Response: Oriented  Best Motor Response: Obeys commands  Kaitlyn Coma Scale Score: 15                CIWA Assessment  BP: 139/81  Pulse: 58           PHYSICAL EXAM  1 or more Elements     ED Triage Vitals [06/05/23 1345]   BP Temp Temp Source Pulse Respirations SpO2 Height Weight - Scale   139/81 98.7 °F (37.1 °C) Oral 58 18 98 % 5' 5\" (1.651 m) 170 lb (77.1 kg)       General: No acute distress. Alert and Oriented. Appears stated age. HEENT:  No difficulty tolerating oral secretions. There is poor dentition. Cracked tooth #29. There is no periapical abscess. There is no posterior pharyngeal swelling. There is no stridor. Cardiac: Regular rate  Chest: No respiratory distress. No use of accessory muscles for respiration. Extremities:No significant lower extremity edema. Lower extremities are symmetric. Neuro: Moving all extremities. No focal deficits. Speech is clear. Skin:No rash, no erythema  Psych: Calm and cooperative. DIAGNOSTIC RESULTS   LABS:    Labs Reviewed - No data to display    When ordered only abnormal lab results are displayed. All other labs were within normal range or not returned as of this dictation. RADIOLOGY:   Non-plain film images such as CT, Ultrasound and MRI are read by the radiologist. Plain radiographic images are visualized and preliminarily interpreted by the ED Provider with the below findings:        Interpretation per the Radiologist below, if available at the time of this note:    No orders to display     No results found. Bedside Ultrasound, as interpreted by me, if performed:    No results found.     PROCEDURES     Unless otherwise noted below, none     Procedures    CRITICAL CARE TIME     I personally spent a total of 0

## 2023-06-20 ENCOUNTER — HOSPITAL ENCOUNTER (EMERGENCY)
Age: 33
Discharge: HOME | End: 2023-06-20
Payer: MEDICAID

## 2023-06-20 VITALS
DIASTOLIC BLOOD PRESSURE: 68 MMHG | BODY MASS INDEX: 24.99 KG/M2 | HEIGHT: 65 IN | SYSTOLIC BLOOD PRESSURE: 117 MMHG | RESPIRATION RATE: 16 BRPM | HEART RATE: 68 BPM | WEIGHT: 150 LBS | OXYGEN SATURATION: 98 % | TEMPERATURE: 98.4 F

## 2023-06-20 DIAGNOSIS — K02.9 PAIN DUE TO DENTAL CARIES: Primary | ICD-10-CM

## 2023-06-20 PROCEDURE — 99283 EMERGENCY DEPT VISIT LOW MDM: CPT

## 2023-06-20 RX ORDER — NICOTINE 14MG/24HR
1 PATCH, TRANSDERMAL 24 HOURS TRANSDERMAL DAILY
Qty: 60 CAPSULE | Refills: 0 | Status: SHIPPED | OUTPATIENT
Start: 2023-06-20

## 2023-06-20 RX ORDER — AMOXICILLIN 500 MG/1
500 CAPSULE ORAL 3 TIMES DAILY
Qty: 30 CAPSULE | Refills: 0 | Status: SHIPPED | OUTPATIENT
Start: 2023-06-20 | End: 2023-06-30

## 2023-06-20 ASSESSMENT — PAIN SCALES - GENERAL: PAINLEVEL_OUTOF10: 5

## 2023-06-20 ASSESSMENT — ENCOUNTER SYMPTOMS
DIARRHEA: 0
FACIAL SWELLING: 1
RHINORRHEA: 0
SORE THROAT: 0
TROUBLE SWALLOWING: 0
ABDOMINAL PAIN: 0
SHORTNESS OF BREATH: 0
VOICE CHANGE: 0
NAUSEA: 0
EYE PAIN: 0
BLOOD IN STOOL: 0
COUGH: 0
VOMITING: 0
BACK PAIN: 0

## 2023-06-20 ASSESSMENT — PAIN DESCRIPTION - LOCATION: LOCATION: FACE

## 2023-06-20 ASSESSMENT — PAIN - FUNCTIONAL ASSESSMENT: PAIN_FUNCTIONAL_ASSESSMENT: 0-10

## 2023-06-20 NOTE — ED PROVIDER NOTES
[address] ED  EMERGENCY DEPARTMENT ENCOUNTER        Pt Name: Radha Gutierrez  MRN: [id number]  [place] 1990  Date of evaluation: 6/20/2023  Provider: IMTIAZ Colbert CNP  PCP: Aspen Chaudhary  Note Started: 12:03 PM EDT 6/20/23      ANABELA. I have evaluated this patient. CHIEF COMPLAINT       Chief Complaint   Patient presents with    Jaw Pain    Facial Swelling     Left side, recently treated for tooth infection, finished antibiotics       HISTORY OF PRESENT ILLNESS: 1 or more Elements     History From: Patient    Limitations to history : None    Social Determinants Significantly Affecting Health : None    Chief Complaint: Dental pain    Radha Gutierrez is a 28 y.o. female who presents to the emergency department with symptoms of dental pain. Patient reported symptoms of dental pain located in the lower left jaw. No fever no chills. No neck or back pain. Denies any pain or difficulty opening mouth. No difficulty swallowing. Nursing Notes were all reviewed and agreed with or any disagreements were addressed in the HPI. REVIEW OF SYSTEMS :      Review of Systems   Constitutional:  Negative for chills, diaphoresis and fever. HENT:  Positive for dental problem and facial swelling. Negative for congestion, ear pain, rhinorrhea, sore throat, trouble swallowing and voice change. Eyes:  Negative for pain and visual disturbance. Respiratory:  Negative for cough and shortness of breath. Cardiovascular:  Negative for chest pain and leg swelling. Gastrointestinal:  Negative for abdominal pain, blood in stool, diarrhea, nausea and vomiting. Genitourinary:  Negative for difficulty urinating, dysuria, flank pain and frequency. Musculoskeletal:  Negative for back pain and neck pain. Skin:  Negative for rash and wound. Neurological:  Negative for dizziness and light-headedness. Positives and Pertinent negatives as per HPI.      SURGICAL HISTORY     Past
Authored by Resident/PA/NP

## 2023-06-20 NOTE — DISCHARGE INSTRUCTIONS
Dental Emergency Referrals    [address] [facility] residents only)    [facility] [place]  [address] [phone]   [facility].  [phone]   [address] [facility]  [address]  [phone]   [facility] [place]  (entrance on [address] [facility]. [address] [place] [address].)  [address]  [phone]   [facility] [address].  [phone]   [facility]  [address]  [phone]       [address] [facility]  [address] [facility].  [phone]   [facility]  [address]  [phone]     [facility]  [address] [facility]. 2nd floor  [phone]   [facility]  [address] [phone] ([address] [facility])  [place]: [address]: [address]  [phone]   [address] [facility]/ [address]  [phone]     [facility]  [address]  [phone]   [address]  [address] [facility].  Oral Surgery Dept: [phone]  Dental Clinic: [address] [facility] [address]  [facility]  [phone]     [address] [place] [address] Drive [phone]   [facility]   [address] [place], [address]  [place] : [address] : [phone]     [facility]  [address] [facility] [address] [facility] [address]    Other [address] in the area    [address] [facility] (Dental Urgent Care)  Crossings of [facility]  (Across from [place])  [facility], [address]  [phone]?       Pediatric Only Dentists    [address]Third Floor   Up to age 21  [address]  Up to age 24  [place]:

## 2024-05-27 ENCOUNTER — HOSPITAL ENCOUNTER (EMERGENCY)
Age: 34
Discharge: HOME | End: 2024-05-27

## 2024-05-27 VITALS
TEMPERATURE: 98.5 F | OXYGEN SATURATION: 97 % | DIASTOLIC BLOOD PRESSURE: 89 MMHG | RESPIRATION RATE: 15 BRPM | HEART RATE: 64 BPM | SYSTOLIC BLOOD PRESSURE: 141 MMHG

## 2024-05-27 DIAGNOSIS — K08.89 PAIN, DENTAL: Primary | ICD-10-CM

## 2024-05-27 PROCEDURE — 6370000000 HC RX 637 (ALT 250 FOR IP): Performed by: REGISTERED NURSE

## 2024-05-27 PROCEDURE — 99283 EMERGENCY DEPT VISIT LOW MDM: CPT

## 2024-05-27 RX ORDER — AMOXICILLIN AND CLAVULANATE POTASSIUM 875; 125 MG/1; MG/1
1 TABLET, FILM COATED ORAL ONCE
Status: COMPLETED | OUTPATIENT
Start: 2024-05-27 | End: 2024-05-27

## 2024-05-27 RX ORDER — AMOXICILLIN AND CLAVULANATE POTASSIUM 875; 125 MG/1; MG/1
1 TABLET, FILM COATED ORAL 2 TIMES DAILY
Qty: 14 TABLET | Refills: 0 | Status: SHIPPED | OUTPATIENT
Start: 2024-05-27 | End: 2024-06-03

## 2024-05-27 RX ADMIN — AMOXICILLIN AND CLAVULANATE POTASSIUM 1 TABLET: 875; 125 TABLET, FILM COATED ORAL at 17:03

## 2024-05-27 ASSESSMENT — PAIN DESCRIPTION - ORIENTATION: ORIENTATION: RIGHT;UPPER

## 2024-05-27 ASSESSMENT — PAIN SCALES - GENERAL: PAINLEVEL_OUTOF10: 6

## 2024-05-27 ASSESSMENT — PAIN DESCRIPTION - DESCRIPTORS: DESCRIPTORS: BURNING;THROBBING

## 2024-05-27 ASSESSMENT — ENCOUNTER SYMPTOMS
SORE THROAT: 0
TROUBLE SWALLOWING: 0
FACIAL SWELLING: 0

## 2024-05-27 ASSESSMENT — PAIN - FUNCTIONAL ASSESSMENT: PAIN_FUNCTIONAL_ASSESSMENT: 0-10

## 2024-05-27 NOTE — DISCHARGE INSTRUCTIONS
[facility] on [address]    [phone] or [phone]  [facility]: [address]  [phone]    [facility]  Up to age 16  [address] [phone]

## 2024-05-27 NOTE — ED PROVIDER NOTES
[facility] ED  EMERGENCY DEPARTMENT ENCOUNTER        Pt Name: Flores Alex  MRN: [id number]  Birthdate 1990  Date of evaluation: 5/27/2024  Provider: IMTIAZ Love - CNP  PCP: Vince Harden MD    This patient was not seen and evaluated by the attending physician No att. providers found.    I have evaluated this patient. My supervising physician was available for consultation.      CHIEF COMPLAINT       Chief Complaint   Patient presents with    Dental Pain     Right upper dental pain, patient had full mouth extraction but one tooth left and it is painful and she thinks it is infected       HISTORY OF PRESENT ILLNESS   (Location/Symptom, Timing/Onset, Context/Setting, Quality, Duration, Modifying Factors, Severity)  Note limiting factors.     History from : Patient  Flores Alxe is a 33 y.o. female who presents via private car for dental pain. Onset was yesterday. Duration has been worsening since the onset. Context includes patient presents to the emergency department today with complaints of right upper dental pain that began yesterday and seems worsening today.  She did have full extraction of her teeth in the upper dental line in January but thinks that they possibly left one tooth.  She states the area became painful and swollen yesterday and she has had multiple abscess to this area and her pain is worse today.  She denies any difficulty swallowing or tolerating oral secretions.  She denies mastoid tenderness, sublingual or submental swelling.  She denies trismus.  She denies any fevers or chills, neck pain or stiffness. Quality is dull and aching with radiation to her right upper dental line. Alleviating factors include nothing. Aggravating factors include nothing. Pain is 6/10.  Nothing has been used for pain today.       Chart review reveals pt has significant PMHx of asthma, history of hepatitis C. They take Pepcid and omeprazole.     Nursing Notes were all

## 2025-03-16 ENCOUNTER — HOSPITAL ENCOUNTER (EMERGENCY)
Age: 35
Discharge: HOME | End: 2025-03-16

## 2025-03-16 VITALS
TEMPERATURE: 98.2 F | HEIGHT: 65 IN | DIASTOLIC BLOOD PRESSURE: 83 MMHG | RESPIRATION RATE: 16 BRPM | HEART RATE: 63 BPM | WEIGHT: 170 LBS | OXYGEN SATURATION: 100 % | BODY MASS INDEX: 28.32 KG/M2 | SYSTOLIC BLOOD PRESSURE: 126 MMHG

## 2025-03-16 DIAGNOSIS — B37.2 CANDIDIASIS OF SKIN: Primary | ICD-10-CM

## 2025-03-16 PROCEDURE — 99283 EMERGENCY DEPT VISIT LOW MDM: CPT

## 2025-03-16 RX ORDER — CLOTRIMAZOLE 1 %
CREAM (GRAM) TOPICAL
Qty: 28 G | Refills: 0 | Status: SHIPPED | OUTPATIENT
Start: 2025-03-16 | End: 2025-03-23

## 2025-03-16 ASSESSMENT — PAIN DESCRIPTION - PAIN TYPE: TYPE: ACUTE PAIN

## 2025-03-16 ASSESSMENT — PAIN SCALES - GENERAL: PAINLEVEL_OUTOF10: 3

## 2025-03-16 ASSESSMENT — PAIN - FUNCTIONAL ASSESSMENT: PAIN_FUNCTIONAL_ASSESSMENT: 0-10

## 2025-03-16 ASSESSMENT — PAIN DESCRIPTION - LOCATION: LOCATION: OTHER (COMMENT)

## 2025-03-16 NOTE — DISCHARGE INSTRUCTIONS
Please return to this department for any new or worsening symptoms including increased pain, spreading redness, fevers, purulent drainage, or other concerns of infection.  Please use soap and water to clean the affected area.  Keep it dry.

## 2025-03-16 NOTE — ED PROVIDER NOTES
topically 2 times daily., Disp-28 g, R-0, Normal             DISCONTINUED MEDICATIONS:  Discharge Medication List as of 3/16/2025  2:56 PM                 (Please note that portions of this note were completed with a voice recognition program.  Efforts were made to edit the dictations but occasionally words are mis-transcribed.)    DOMITILA Hollins (electronically signed)      Juani Majano PA  03/16/25 4820